# Patient Record
Sex: FEMALE | NOT HISPANIC OR LATINO | URBAN - METROPOLITAN AREA
[De-identification: names, ages, dates, MRNs, and addresses within clinical notes are randomized per-mention and may not be internally consistent; named-entity substitution may affect disease eponyms.]

---

## 2024-07-02 ENCOUNTER — HOSPITAL ENCOUNTER (EMERGENCY)
Facility: HOSPITAL | Age: 64
Discharge: HOME | End: 2024-07-02
Attending: EMERGENCY MEDICINE
Payer: COMMERCIAL

## 2024-07-02 VITALS
DIASTOLIC BLOOD PRESSURE: 89 MMHG | HEART RATE: 87 BPM | WEIGHT: 180 LBS | SYSTOLIC BLOOD PRESSURE: 165 MMHG | TEMPERATURE: 98 F | BODY MASS INDEX: 28.93 KG/M2 | HEIGHT: 66 IN | OXYGEN SATURATION: 97 % | RESPIRATION RATE: 16 BRPM

## 2024-07-02 DIAGNOSIS — G89.29 CHRONIC PAIN OF LEFT KNEE: Primary | ICD-10-CM

## 2024-07-02 DIAGNOSIS — M25.562 CHRONIC PAIN OF LEFT KNEE: Primary | ICD-10-CM

## 2024-07-02 DIAGNOSIS — M17.12 OSTEOARTHRITIS OF LEFT KNEE, UNSPECIFIED OSTEOARTHRITIS TYPE: ICD-10-CM

## 2024-07-02 PROCEDURE — 63600175 PHARM REV CODE 636 W HCPCS: Performed by: PHYSICIAN ASSISTANT

## 2024-07-02 PROCEDURE — 96372 THER/PROPH/DIAG INJ SC/IM: CPT | Performed by: PHYSICIAN ASSISTANT

## 2024-07-02 PROCEDURE — 25000003 PHARM REV CODE 250: Performed by: PHYSICIAN ASSISTANT

## 2024-07-02 PROCEDURE — 99284 EMERGENCY DEPT VISIT MOD MDM: CPT | Mod: 25

## 2024-07-02 RX ORDER — ACETAMINOPHEN 325 MG/1
650 TABLET ORAL
Status: COMPLETED | OUTPATIENT
Start: 2024-07-02 | End: 2024-07-02

## 2024-07-02 RX ORDER — IBUPROFEN 800 MG/1
800 TABLET ORAL EVERY 6 HOURS PRN
Qty: 20 TABLET | Refills: 0 | Status: SHIPPED | OUTPATIENT
Start: 2024-07-02

## 2024-07-02 RX ORDER — KETOROLAC TROMETHAMINE 30 MG/ML
10 INJECTION, SOLUTION INTRAMUSCULAR; INTRAVENOUS
Status: COMPLETED | OUTPATIENT
Start: 2024-07-02 | End: 2024-07-02

## 2024-07-02 RX ADMIN — ACETAMINOPHEN 650 MG: 325 TABLET ORAL at 01:07

## 2024-07-02 RX ADMIN — KETOROLAC TROMETHAMINE 10 MG: 30 INJECTION, SOLUTION INTRAMUSCULAR; INTRAVENOUS at 01:07

## 2024-07-02 NOTE — ED PROVIDER NOTES
"Encounter Date: 7/2/2024       History     Chief Complaint   Patient presents with    Knee Pain     L knee pain seen by ortho, wanting mri     12:52 PM  Patient is a 65 yo female with a history of diabetes mellitus type 2, hypertension, small goiter with history of hyperthyroidis who presents the [facility] ED via POV for emergent evaluation of left knee pain.    States that she has "bone-on-bone" of her left knee.  States that she was told that she needed replacement but opted all in tried other therapies.  Currently she is getting "gel therapy" which sounds like Viscosupplementation Treatment for her knee arthritis.  She had an injection a few weeks ago.  She presents to [facility] ED today for a 2nd opinion.  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  She has not tried physical therapy at.  She takes ibuprofen for pain relief.  Was prescribed tramadol but she did not like the way it made her feel.    She is supposed to see Endocrinology for her diabetes to see if she qualifies for steroid treatment with orthopedics.  She would like an MRI in the ED.        Review of patient's allergies indicates:  No Known Allergies  History reviewed. No pertinent past medical history.  History reviewed. No pertinent surgical history.  No family history on file.  Social History     Tobacco Use    Smoking status: Never    Smokeless tobacco: Never   Substance Use Topics    Alcohol use: Never    Drug use: Never     Review of Systems   Constitutional:  Negative for fever.   HENT:  Negative for sore throat.    Respiratory:  Negative for shortness of breath.    Cardiovascular:  Negative for chest pain.   Gastrointestinal:  Negative for nausea.   Genitourinary:  Negative for dysuria.   Musculoskeletal:  Positive for arthralgias. Negative for back pain.   Skin:  Negative for rash.   Neurological:  Negative for weakness.   Hematological:  Does not bruise/bleed easily.       Physical Exam "     Initial Vitals [07/02/24 1213]   BP Pulse Resp Temp SpO2   (!) 165/89 87 16 97.5 °F (36.4 °C) 97 %      MAP       --         Physical Exam    Vitals reviewed.  Constitutional: She appears well-developed and well-nourished. She is not diaphoretic. She is cooperative.  Non-toxic appearance. She does not have a sickly appearance. She does not appear ill. No distress.   HENT:   Head: Normocephalic and atraumatic.   Nose: Nose normal.   Mouth/Throat: No trismus in the jaw.   Eyes: Conjunctivae and EOM are normal.   Neck:   Normal range of motion.  Pulmonary/Chest: No accessory muscle usage. No tachypnea. No respiratory distress.   Abdominal: She exhibits no distension.   Musculoskeletal:         General: Normal range of motion.      Cervical back: Normal range of motion.      Right knee: Normal.      Left knee: No swelling, deformity, effusion, erythema, ecchymosis, lacerations, bony tenderness or crepitus. Normal range of motion. No tenderness. No ACL laxity or PCL laxity.Normal alignment and normal patellar mobility.      Comments: She had mild pain with Ritesh's test to internal rotation.  No unilateral leg swelling or calf tenderness.   She is ambulatory and can bear weight without assistance; states she prefers to walk with her leg straight because it eases the pain.     Neurological: She is alert. She has normal strength.   Skin: Skin is warm and dry. No erythema. No pallor.         ED Course   Procedures  Labs Reviewed - No data to display       Imaging Results    None          Medications   acetaminophen tablet 650 mg (650 mg Oral Given 7/2/24 1321)   ketorolac injection 9.999 mg (9.999 mg Intramuscular Given 7/2/24 1322)     Medical Decision Making  Patient is a 63 yo female with a history of diabetes mellitus type 2, hypertension, small goiter with history of hyperthyroidis who presents the [facility] ED via POV for emergent evaluation of left knee pain.    Differential diagnosis includes but isn't limited to  DDD, knee sprain, other ligamentous injury such as meniscus tear.  Doubt septic arthritis given exam.  No clinical signs or symptoms of DVT.  She does not have posterior knee pain that would suggest Baker's cyst.  No recent injury, trauma, or falls.  No bony tenderness.  She can bear weight.  Doubt fractures, dislocations.    Patient presents to [facility] ED for 2nd opinion of chronic left knee pain.  She is currently getting Knee Gel Injections (Viscosupplementation) for her knee pain with her outpatient orthopedist.  She is frustrated because she continues to have chronic pain despite recent therapy.  She had a little bit of pain with Tania's test on internal rotation which may suggest meniscus injury.  She also has which sounds like severe osteoarthritis of her left knee which they recommended surgery for but she opted out and wanted to try other therapies instead which is what she is currently doing.  I think the management of her knee pain is appropriate.  I explained to her that we do not get non emergent MRIs in the emergency department as there are no indications for any emergent MRI.  She understood this.  We will continue treat her conservatively.  She is diabetic and would not like steroids at this time.  She would like to talk to her endocrinologist which is fine.  I will give her Toradol intramuscular injection and acetaminophen.  We discussed continuing treatment with NSAIDs and acetaminophen.  She has no history of CKD.  I referred her to PT to see if this can help. We discussed weight loss and exercises that include workouts that does not put pressure on her knees. Follow-up with orthopedist here for another opinion as needed otherwise follow-up with hers.  All of her questions were answered.  Patient comfortable with plan and stable for discharge.    Risk  OTC drugs.  Prescription drug management.                                      Clinical Impression:  Final diagnoses:  [M25.562, G89.29] Chronic  pain of left knee (Primary)  [M17.12] Osteoarthritis of left knee, unspecified osteoarthritis type          ED Disposition Condition    Discharge           ED Prescriptions       Medication Sig Dispense Start Date End Date Auth. Provider    ibuprofen (ADVIL,MOTRIN) 800 MG tablet Take 1 tablet (800 mg total) by mouth every 6 (six) hours as needed. 20 tablet 7/2/2024 -- Sherin Lou PA-C          Follow-up Information       Follow up With Specialties Details Why Contact Info Additional Information    [facility] - [facility] Orth[facility]ics Schedule an appointment as soon as possible for a visit   [facility]  [facility] [id number] [phone] [facility] - Main Building, 5th Floor Please park in [facility] and take [address] elevator    [facility] - Rehab ([facility]) Physical Therapy Schedule an appointment as soon as possible for a visit   [facility] [id number] [phone]     [facility] - Emergency Dept Emergency Medicine  If symptoms worsen [facility] [id number] [phone]              Sherin Lou PA-C  07/02/24 1340

## 2024-07-02 NOTE — DISCHARGE INSTRUCTIONS
Look into Silver Sneakers with [employer]. You can go to the gym for free. You were given toradol intramuscular injection.  Take ibuprofen 600 mg every 6 hours as needed with food for anti-inflammatory relief.  You can take acetaminophen/tylenol 650 mg every 6 hours for added relief.  Apply ice to the area for 10-20 minutes every 4 hours. You can apply heat 2 days after for the same duration and frequency.  Start therapy. Referral placed.   Consider weight loss to ease pressure off bones.  Follow up with Orthopedics here for 2nd opinion.  Return to the ER for new or worsening symptoms.

## 2024-07-02 NOTE — ED TRIAGE NOTES
Birdie Dsouza, a 64 y.o. female presents to the ED w/ complaint of left knee pain, twisted a few weeks ago at work, saw ortho given injections but wants MRI to rule out injury    Triage note:  Chief Complaint   Patient presents with    Knee Pain     L knee pain seen by ortho, wanting mri     Review of patient's allergies indicates:  Not on File  No past medical history on file.      APPEARANCE: awake and alert in NAD. PAIN  7/10  SKIN: warm, dry and intact. No breakdown or bruising.  MUSCULOSKELETAL: Patient moving all extremities spontaneously, no obvious swelling or deformities noted. Ambulates independently.  RESPIRATORY: Denies shortness of breath.Respirations unlabored.   CARDIAC: Denies CP, 2+ distal pulses; no peripheral edema  ABDOMEN: S/ND/NT, Denies nausea  : voids spontaneously, denies difficulty  Neurologic: AAO x 4; follows commands equal strength in all extremities; denies numbness/tingling. Denies dizziness

## 2025-04-10 ENCOUNTER — TELEPHONE (OUTPATIENT)
Dept: PODIATRY | Facility: CLINIC | Age: 65
End: 2025-04-10
Payer: COMMERCIAL

## 2025-04-10 NOTE — TELEPHONE ENCOUNTER
Spoke with the pt and scheduled her for 4-25-25 at 8:45 with Dr Black           ----- Message from Iniki sent at 4/10/2025  1:14 PM CDT -----  .Type: Patient Call BackWho called:patientWhat is the request in detail:   #calling concerning needing to schedule a nail  care appt regarding fungus on toenails on both feet. patient stated she is diabetic and is concerned that the fungus could travelCan the clinic reply by [facility]?   Would the patient rather a call back or a response via [facility] call back number:  [phone]

## 2025-04-25 ENCOUNTER — OFFICE VISIT (OUTPATIENT)
Dept: PODIATRY | Facility: CLINIC | Age: 65
End: 2025-04-25
Payer: COMMERCIAL

## 2025-04-25 VITALS — WEIGHT: 179.88 LBS | BODY MASS INDEX: 28.91 KG/M2 | HEIGHT: 66 IN

## 2025-04-25 DIAGNOSIS — L60.9 DISEASE OF NAIL: ICD-10-CM

## 2025-04-25 DIAGNOSIS — B35.3 TINEA PEDIS OF BOTH FEET: Primary | ICD-10-CM

## 2025-04-25 PROCEDURE — 87101 SKIN FUNGI CULTURE: CPT | Performed by: PODIATRIST

## 2025-04-25 PROCEDURE — 4010F ACE/ARB THERAPY RXD/TAKEN: CPT | Mod: CPTII,S$GLB,, | Performed by: PODIATRIST

## 2025-04-25 PROCEDURE — 1126F AMNT PAIN NOTED NONE PRSNT: CPT | Mod: CPTII,S$GLB,, | Performed by: PODIATRIST

## 2025-04-25 PROCEDURE — 1101F PT FALLS ASSESS-DOCD LE1/YR: CPT | Mod: CPTII,S$GLB,, | Performed by: PODIATRIST

## 2025-04-25 PROCEDURE — 99999 PR PBB SHADOW E&M-EST. PATIENT-LVL III: CPT | Mod: PBBFAC,,, | Performed by: PODIATRIST

## 2025-04-25 PROCEDURE — 1159F MED LIST DOCD IN RCRD: CPT | Mod: CPTII,S$GLB,, | Performed by: PODIATRIST

## 2025-04-25 PROCEDURE — 3008F BODY MASS INDEX DOCD: CPT | Mod: CPTII,S$GLB,, | Performed by: PODIATRIST

## 2025-04-25 PROCEDURE — 99204 OFFICE O/P NEW MOD 45 MIN: CPT | Mod: S$GLB,,, | Performed by: PODIATRIST

## 2025-04-25 PROCEDURE — 3288F FALL RISK ASSESSMENT DOCD: CPT | Mod: CPTII,S$GLB,, | Performed by: PODIATRIST

## 2025-04-25 PROCEDURE — 1160F RVW MEDS BY RX/DR IN RCRD: CPT | Mod: CPTII,S$GLB,, | Performed by: PODIATRIST

## 2025-04-25 RX ORDER — CLOTRIMAZOLE AND BETAMETHASONE DIPROPIONATE 10; .64 MG/G; MG/G
CREAM TOPICAL 2 TIMES DAILY
Qty: 45 G | Refills: 1 | Status: SHIPPED | OUTPATIENT
Start: 2025-04-25

## 2025-04-25 RX ORDER — CHOLECALCIFEROL (VITAMIN D3) 50 MCG
TABLET ORAL
COMMUNITY

## 2025-04-25 RX ORDER — AMLODIPINE BESYLATE 5 MG/1
5 TABLET ORAL
COMMUNITY
Start: 2024-07-16 | End: 2025-07-16

## 2025-04-25 RX ORDER — LISINOPRIL AND HYDROCHLOROTHIAZIDE 20; 25 MG/1; MG/1
1 TABLET ORAL
COMMUNITY
Start: 2024-07-16 | End: 2025-07-16

## 2025-04-25 RX ORDER — AMMONIUM LACTATE 12 G/100G
1 CREAM TOPICAL 2 TIMES DAILY
Qty: 140 G | Refills: 0 | Status: SHIPPED | OUTPATIENT
Start: 2025-04-25

## 2025-04-25 RX ORDER — METFORMIN HYDROCHLORIDE 500 MG/1
1500 TABLET, EXTENDED RELEASE ORAL
COMMUNITY

## 2025-04-25 NOTE — PROGRESS NOTES
"Subjective:     Patient ID: Birdie Dsouza is a 64 y.o. female.    Chief Complaint: Fungus (Diabetic pt c/o BL toenail fungus, pt wears tennis shoes, PCP Ashutosh Zamora MD last seen 4/16/2025)    Birdie is a 64 y.o. female who presents to the clinic complaining of thick and discolored toenails on both feet. Birdie is inquiring about treatment options. Patient states she has been dealing with dry skin and fungal toenails for awhile. Patient states she has been using Ketoconazole in the past that helped some. Patient has no other pedal complains tat this time.     Problem List[1]    Medication List with Changes/Refills   New Medications    AMMONIUM LACTATE 12 % CREA    Apply 1 Act topically 2 (two) times daily.    CLOTRIMAZOLE-BETAMETHASONE 1-0.05% (LOTRISONE) CREAM    Apply topically 2 (two) times daily.   Current Medications    AMLODIPINE (NORVASC) 5 MG TABLET    Take 5 mg by mouth.    CHOLECALCIFEROL, VITAMIN D3, (VITAMIN D3) 50 MCG (2,000 UNIT) TAB    Take by mouth.    IBUPROFEN (ADVIL,MOTRIN) 800 MG TABLET    Take 1 tablet (800 mg total) by mouth every 6 (six) hours as needed.    LISINOPRIL-HYDROCHLOROTHIAZIDE (PRINZIDE,ZESTORETIC) 20-25 MG TAB    Take 1 tablet by mouth.    METFORMIN (GLUCOPHAGE-XR) 500 MG ER 24HR TABLET    Take 1,500 mg by mouth.    SODIUM HYALURONATE, EUFLEXXA, (EUFLEXXA) 10 MG/ML(MW 2.4 -3.6 MILLION) INJECTION    Inject 20 mg into the articular space.       Review of patient's allergies indicates:   Allergen Reactions    Codeine        History reviewed. No pertinent surgical history.    No family history on file.    Social History[2]    Vitals:    04/25/25 0829   Weight: 81.6 kg (179 lb 14.3 oz)   Height: 5' 6" (1.676 m)   PainSc: 0-No pain       Hemoglobin A1C   Date Value Ref Range Status   11/20/2024 7.5 (H) 4.2 - 5.8 % Final   07/25/2024 7.7 (H) 4.2 - 5.8 % Final   03/25/2024 8.6 (H) 4.2 - 5.8 % Final       Review of Systems   Constitutional:  Negative for chills and fever. "   Cardiovascular: Negative.    Gastrointestinal:  Negative for diarrhea, nausea and vomiting.   Skin:  Positive for itching and rash.   Neurological: Negative.    Endo/Heme/Allergies: Negative.    Psychiatric/Behavioral: Negative.           Objective:      PHYSICAL EXAM: Apperance: Alert and orient in no distress,well developed, and with good attention to grooming and body habits  Lower Extremity Exam  VASCULAR: Dorsalis pedis pulses 2/4 bilateral and Posterior Tibial pulses 2/4 bilateral.   DERMATOLOGICAL: No skin rash, subcutaneous nodules, lesions or ulcers observed. Dry and scaly skin noted plantarly bilateral in moccasin distribution. Webspaces 3,4 bilateral are fissured.  Nails 1,2,5 bilateral elongated, thickened, and discolored with subungual debris.   NEUROLOGICAL: Light touch, sharp-dull, proprioception all present and equal bilaterally.    MUSCULOSKELETAL: Muscle strength is 5/5 for foot inverters, everters, plantarflexors, and dorsiflexors. Muscle tone is normal.           Assessment:       ICD-10-CM ICD-9-CM   1. Tinea pedis of both feet  B35.3 110.4   2. Disease of nail  L60.9 703.9       Plan:   Tinea pedis of both feet  -     clotrimazole-betamethasone 1-0.05% (LOTRISONE) cream; Apply topically 2 (two) times daily.  Dispense: 45 g; Refill: 1  -     ammonium lactate 12 % Crea; Apply 1 Act topically 2 (two) times daily.  Dispense: 140 g; Refill: 0    Disease of nail  -     Fungal culture , skin, hair, or nails    I counseled the patient on her conditions, regarding findings of my examination, my impressions, and usual treatment plan.   Discuss treatment options for tinea pedis.  I explained that fungus lives in a warm dark moist environment and therefore patient should make every attempt to keep feet clean and dry.  We discussed drying feet thoroughly after shower particularly between the toes.   Patient instructed to spray all shoes with Lysol disinfectant spray and let dry before wearing. Patient  instructed to wash all socks in hot water and bleach.  We discussed using Lamisil for tinea pedis. This drug offers a fairly high but not universal cure rate. A 2-4 week treatment course is recommended. The patient is aware that rare cases of liver injury have been reported; and agrees to have a liver function tests performed. The symptoms of liver disease have been discussed; call if such occurs. Other side effects, such as headaches and rashes, have also been discussed.  Prescribed Lotrisone cream to be applied twice daily to areas of feet.   Prescription written for Ammonium Lactate 12% cream to be applied twice daily to area for moisturizer.  Discuss treatment options for nail fungus.  I explained that fungus lives in a warm dark moist environment and therefore patient should make every attempt to keep feet clean and dry.  We discussed drying feet thoroughly after shower particularly between the toes and then applying powder between the toes and in the shoes.    For fungal toenails I prescribed topical medication to be used daily for up to a year.  We also discussed oral Lamisil but I did not recommend it as a first line of treatment since it is an internal medicine that may potentially have side effects, including liver problems.   With patient's permission, nail clippings obtained for fungal nail culture.   Patient to return in 6 weeks or sooner if needed.          Malcom Black DPM  [facility]          [1] There is no problem list on file for this patient.   [2]   Social History  Socioeconomic History    Marital status:    Tobacco Use    Smoking status: Never    Smokeless tobacco: Never   Substance and Sexual Activity    Alcohol use: Never    Drug use: Never    Sexual activity: Not Currently     Social Drivers of Health     Financial Resource Strain: High Risk (7/5/2024)    Received from [facility]    Overall Financial Resource Strain (CARDIA)     Difficulty of Paying  Living Expenses: Hard   Food Insecurity: Unknown (7/5/2024)    Received from [facility]    Hunger Vital Sign     Worried About Running Out of Food in the Last Year: Never true   Transportation Needs: Unknown (7/5/2024)    Received from [facility]    PRAPARE - Transportation     Lack of Transportation (Medical): No   Physical Activity: Inactive (7/5/2024)    Received from [facility]    Exercise Vital Sign     Days of Exercise per Week: 0 days     Minutes of Exercise per Session: 0 min   Stress: Stress Concern Present (7/5/2024)    Received from [facility]    [employer] [place] [employer] - Occupational Stress Questionnaire     Feeling of Stress : Rather much   Housing Stability: Unknown (7/5/2024)    Received from [facility]    Housing Stability Vital Sign     Unable to Pay for Housing in the Last Year: No

## 2025-04-28 LAB — FUNGUS NAIL CULT: NORMAL

## 2025-05-01 ENCOUNTER — TELEPHONE (OUTPATIENT)
Dept: OBSTETRICS AND GYNECOLOGY | Facility: CLINIC | Age: 65
End: 2025-05-01
Payer: COMMERCIAL

## 2025-05-01 NOTE — TELEPHONE ENCOUNTER
----- Message from Sophia sent at 5/1/2025  9:28 AM CDT -----  Type:  Appointment RequestCaller is requesting a sooner appointment.  Caller declined first available appointment listed below.  Caller will not accept being placed on the waitlist and is requesting a message be sent to doctor.Name of Caller:pt When is the first available appointment?Symptoms:Annual Would the patient rather a call back or a response via [facility]? Call Best Call Back Number: [phone] Information: pt stated she had some spotting a few ago she would like to get check out as well

## 2025-05-02 ENCOUNTER — TELEPHONE (OUTPATIENT)
Dept: OBSTETRICS AND GYNECOLOGY | Facility: CLINIC | Age: 65
End: 2025-05-02
Payer: COMMERCIAL

## 2025-05-02 NOTE — TELEPHONE ENCOUNTER
Spoke with pt. Pt had spotting that started 2 days ago and lasted for a day. No pain with urination or odor. Pt called asking why she was scheduled with  since she was wanting to see . Explained to pt that  is not seeing NP annuals. Pt asked about NP Corby, informed her that she is over at [facility] so she would need to all over there. She asked about  as well, however  has appts in August. Advised pt that because she is having spotting, she should be seen as soon as possible. Pt VU and will keep appt with  on 5/07 at 9:15am

## 2025-05-14 ENCOUNTER — OFFICE VISIT (OUTPATIENT)
Dept: OBSTETRICS AND GYNECOLOGY | Facility: CLINIC | Age: 65
End: 2025-05-14
Payer: COMMERCIAL

## 2025-05-14 VITALS
BODY MASS INDEX: 29.75 KG/M2 | WEIGHT: 184.31 LBS | DIASTOLIC BLOOD PRESSURE: 92 MMHG | SYSTOLIC BLOOD PRESSURE: 172 MMHG

## 2025-05-14 DIAGNOSIS — Z78.0 POSTMENOPAUSAL: Primary | ICD-10-CM

## 2025-05-14 DIAGNOSIS — N95.0 POSTMENOPAUSAL BLEEDING: ICD-10-CM

## 2025-05-14 PROCEDURE — 99999 PR PBB SHADOW E&M-EST. PATIENT-LVL III: CPT | Mod: PBBFAC,,, | Performed by: OBSTETRICS & GYNECOLOGY

## 2025-05-14 PROCEDURE — 3080F DIAST BP >= 90 MM HG: CPT | Mod: CPTII,S$GLB,, | Performed by: OBSTETRICS & GYNECOLOGY

## 2025-05-14 PROCEDURE — 3288F FALL RISK ASSESSMENT DOCD: CPT | Mod: CPTII,S$GLB,, | Performed by: OBSTETRICS & GYNECOLOGY

## 2025-05-14 PROCEDURE — 3077F SYST BP >= 140 MM HG: CPT | Mod: CPTII,S$GLB,, | Performed by: OBSTETRICS & GYNECOLOGY

## 2025-05-14 PROCEDURE — 4010F ACE/ARB THERAPY RXD/TAKEN: CPT | Mod: CPTII,S$GLB,, | Performed by: OBSTETRICS & GYNECOLOGY

## 2025-05-14 PROCEDURE — 99203 OFFICE O/P NEW LOW 30 MIN: CPT | Mod: S$GLB,,, | Performed by: OBSTETRICS & GYNECOLOGY

## 2025-05-14 PROCEDURE — 1126F AMNT PAIN NOTED NONE PRSNT: CPT | Mod: CPTII,S$GLB,, | Performed by: OBSTETRICS & GYNECOLOGY

## 2025-05-14 PROCEDURE — 3008F BODY MASS INDEX DOCD: CPT | Mod: CPTII,S$GLB,, | Performed by: OBSTETRICS & GYNECOLOGY

## 2025-05-14 PROCEDURE — 1101F PT FALLS ASSESS-DOCD LE1/YR: CPT | Mod: CPTII,S$GLB,, | Performed by: OBSTETRICS & GYNECOLOGY

## 2025-05-14 PROCEDURE — 1159F MED LIST DOCD IN RCRD: CPT | Mod: CPTII,S$GLB,, | Performed by: OBSTETRICS & GYNECOLOGY

## 2025-05-14 NOTE — PROGRESS NOTES
OBSTETRIC HISTORY:   OB History          0    Para   0    Term   0       0    AB   0    Living   0         SAB   0    IAB   0    Ectopic   0    Multiple   0    Live Births   0                  COMPREHENSIVE GYN HISTORY:  PAP History: Denies abnormal Paps.  Infection History: Denies STDs. Denies PID.  Benign History: Denies uterine fibroids. Denies ovarian cysts. Denies endometriosis.   Cancer History: Denies cervical cancer. Denies uterine cancer or hyperplasia. Denies ovarian cancer. Denies vulvar cancer or pre-cancer. Denies vaginal cancer or pre-cancer. Denies breast cancer. Denies colon cancer.  Sexual Activity History:   reports that she is not currently sexually active.     HPI:   65 y.o.  No LMP recorded. Patient is postmenopausal.   Patient is  here complaining of  bleeding. Saw NP 25 that recommended EMBX and US. She did also do her annual. She denies bladder, breast and bowel complaints.    ROS:  GENERAL: No weight gain or weight loss.   CHEST: No shortness of breath.   ABDOMEN: No abdominal pain, constipation, diarrhea, nausea, vomiting or rectal bleeding.   URINARY: No frequency, dysuria, hematuria, or burning on urination.  REPRODUCTIVE: See HPI.   BREASTS: No breast pain, lumps, or nipple discharge.   PSYCHIATRIC: No depression or anxiety.        PE:   BP (!) 172/92   Wt 83.6 kg (184 lb 4.8 oz)   BMI 29.75 kg/m²   APPEARANCE: Well nourished, well developed, in no acute distress.  ABDOMEN: Soft. No tenderness or masses. No hernias.  PELVIC:   VULVA: No lesions. Normal female genitalia.  URETHRAL MEATUS: Normal size and location, no lesions, no prolapse.  URETHRA: No masses, tenderness, prolapse or scarring.  VAGINA: Moist and well rugated, no discharge, no significant cystocele or rectocele.  CERVIX: No lesions and discharge.Minimal blood tinge  UTERUS: Normal size, regular shape, mobile, non-tender, bladder base nontender.  ADNEXA: No masses or  tenderness.    ASSESSMENT/PLAN:   bleeding--discussed importance of work up--will order US  RTO based on results

## 2025-06-16 ENCOUNTER — TELEPHONE (OUTPATIENT)
Dept: OBSTETRICS AND GYNECOLOGY | Facility: CLINIC | Age: 65
End: 2025-06-16
Payer: COMMERCIAL

## 2025-06-16 NOTE — TELEPHONE ENCOUNTER
Spoke with pt. Pt saw  5/14. She was sent for an ultrasound. She hasn't gotten it done yet and is asking if she gets it done at [facility], will  still see it. Explained to her that if it is done outside of [facility], she will need to fax the results and disc to us or it will be in care everywhere. Pt CRISTOPHER.     She wants to see  depending on what the ultrasound reveals. I advised pt to get the ultrasound and see what  advises for next steps and if it is something she'd like to see  for then we can get her scheduled at that time.     Pt CRISTOPHER

## 2025-06-16 NOTE — TELEPHONE ENCOUNTER
Copied from CRM #[id number]. Topic: Appointments - Appointment Scheduling  >> Jun 16, 2025  3:46 PM Gillian wrote:  Type:  Appointment Request    Name of Caller:Birdie Dsouza    When is the first available appointment?No access    Symptoms:results of US     Would the patient rather a call back or a response via [facility]? Call back     Best Call Back Number: [phone]    Additional Information: pt states she was recently seen by Dr. Mckinley but wanted to see the provider first. Pt went ahead in scheduling as she wanted to be seen as soon as possible. pt states  that Dr. Mckinley has scheduled her an pelvic US and states she would like to know if the provider would look at the results of her US once completed. Pt states she wants to go outside of [facility] to have the US completed. Pt states she wants specifically Dr. Velasquez to see the results and would like to speak with someone to see if she does the order outside of [facility] if she will still be able to review the results. Pt would like a call back with further assistance as soon as possible.

## 2025-06-17 ENCOUNTER — TELEPHONE (OUTPATIENT)
Dept: OBSTETRICS AND GYNECOLOGY | Facility: CLINIC | Age: 65
End: 2025-06-17
Payer: COMMERCIAL

## 2025-06-17 NOTE — TELEPHONE ENCOUNTER
Copied from CRM #[id number]. Topic: Appointments - Appointment Scheduling  >> Jun 16, 2025  3:46 PM Gillian wrote:  Type:  Appointment Request    Name of Caller:Birdie Dsouza    When is the first available appointment?No access    Symptoms:results of US     Would the patient rather a call back or a response via [facility]? Call back     Best Call Back Number: [phone]    Additional Information: pt states she was recently seen by Dr. Mckinley but wanted to see the provider first. Pt went ahead in scheduling as she wanted to be seen as soon as possible. pt states  that Dr. Mckinley has scheduled her an pelvic US and states she would like to know if the provider would look at the results of her US once completed. Pt states she wants to go outside of [facility] to have the US completed. Pt states she wants specifically Dr. Velasquez to see the results and would like to speak with someone to see if she does the order outside of [facility] if she will still be able to review the results. Pt would like a call back with further assistance as soon as possible.

## 2025-06-18 ENCOUNTER — TELEPHONE (OUTPATIENT)
Dept: OBSTETRICS AND GYNECOLOGY | Facility: CLINIC | Age: 65
End: 2025-06-18
Payer: COMMERCIAL

## 2025-06-18 NOTE — TELEPHONE ENCOUNTER
Copied from CRM #[id number]. Topic: Appointments - Appointment Scheduling  >> Jun 16, 2025  3:46 PM Gillian wrote:  Type:  Appointment Request    Name of Caller:Birdie Dsouza    When is the first available appointment?No access    Symptoms:results of US     Would the patient rather a call back or a response via [facility]? Call back     Best Call Back Number: [phone]    Additional Information: pt states she was recently seen by Dr. Mckinley but wanted to see the provider first. Pt went ahead in scheduling as she wanted to be seen as soon as possible. pt states  that Dr. Mckinley has scheduled her an pelvic US and states she would like to know if the provider would look at the results of her US once completed. Pt states she wants to go outside of [facility] to have the US completed. Pt states she wants specifically Dr. Velasquez to see the results and would like to speak with someone to see if she does the order outside of [facility] if she will still be able to review the results. Pt would like a call back with further assistance as soon as possible.  
Showering allowed/Walking - Outdoors allowed/Do not drive or operate machinery/No heavy lifting/straining/Walking - Indoors allowed

## 2025-06-23 ENCOUNTER — HOSPITAL ENCOUNTER (OUTPATIENT)
Dept: RADIOLOGY | Facility: HOSPITAL | Age: 65
Discharge: HOME | End: 2025-06-23
Attending: OBSTETRICS & GYNECOLOGY
Payer: COMMERCIAL

## 2025-06-23 DIAGNOSIS — N95.0 POSTMENOPAUSAL BLEEDING: ICD-10-CM

## 2025-06-25 ENCOUNTER — TELEPHONE (OUTPATIENT)
Dept: OBSTETRICS AND GYNECOLOGY | Facility: CLINIC | Age: 65
End: 2025-06-25
Payer: COMMERCIAL

## 2025-06-25 NOTE — TELEPHONE ENCOUNTER
Copied from CRM #[id number]. Topic: General Inquiry - Patient Advice  >> Jun 24, 2025  8:23 AM Rosie wrote:  Type:  Patient Call    Who Called:Pt   Would the patient rather a call back or a response via [facility]? Call back   Best Call Back Number:[phone]  Additional Information: Pt wants to speak with Dr Velasquez about her results and want her advice about what would be best treatment

## 2025-06-26 ENCOUNTER — OFFICE VISIT (OUTPATIENT)
Dept: OBSTETRICS AND GYNECOLOGY | Facility: CLINIC | Age: 65
End: 2025-06-26
Payer: COMMERCIAL

## 2025-06-26 VITALS
DIASTOLIC BLOOD PRESSURE: 96 MMHG | SYSTOLIC BLOOD PRESSURE: 158 MMHG | BODY MASS INDEX: 28.98 KG/M2 | WEIGHT: 179.56 LBS

## 2025-06-26 DIAGNOSIS — C54.1 ENDOMETRIAL CANCER DETERMINED BY UTERINE BIOPSY: Primary | ICD-10-CM

## 2025-06-26 PROCEDURE — 3288F FALL RISK ASSESSMENT DOCD: CPT | Mod: CPTII,S$GLB,, | Performed by: OBSTETRICS & GYNECOLOGY

## 2025-06-26 PROCEDURE — 99999 PR PBB SHADOW E&M-EST. PATIENT-LVL III: CPT | Mod: PBBFAC,,, | Performed by: OBSTETRICS & GYNECOLOGY

## 2025-06-26 PROCEDURE — 3008F BODY MASS INDEX DOCD: CPT | Mod: CPTII,S$GLB,, | Performed by: OBSTETRICS & GYNECOLOGY

## 2025-06-26 PROCEDURE — 1159F MED LIST DOCD IN RCRD: CPT | Mod: CPTII,S$GLB,, | Performed by: OBSTETRICS & GYNECOLOGY

## 2025-06-26 PROCEDURE — 1101F PT FALLS ASSESS-DOCD LE1/YR: CPT | Mod: CPTII,S$GLB,, | Performed by: OBSTETRICS & GYNECOLOGY

## 2025-06-26 PROCEDURE — 3077F SYST BP >= 140 MM HG: CPT | Mod: CPTII,S$GLB,, | Performed by: OBSTETRICS & GYNECOLOGY

## 2025-06-26 PROCEDURE — 99213 OFFICE O/P EST LOW 20 MIN: CPT | Mod: S$GLB,,, | Performed by: OBSTETRICS & GYNECOLOGY

## 2025-06-26 PROCEDURE — 3080F DIAST BP >= 90 MM HG: CPT | Mod: CPTII,S$GLB,, | Performed by: OBSTETRICS & GYNECOLOGY

## 2025-06-26 PROCEDURE — 4010F ACE/ARB THERAPY RXD/TAKEN: CPT | Mod: CPTII,S$GLB,, | Performed by: OBSTETRICS & GYNECOLOGY

## 2025-06-26 NOTE — PROGRESS NOTES
OBSTETRIC HISTORY:   OB History          0    Para   0    Term   0       0    AB   0    Living   0         SAB   0    IAB   0    Ectopic   0    Multiple   0    Live Births   0                COMPREHENSIVE GYN HISTORY:  PAP History: Denies abnormal Paps.  Infection History: Denies STDs. Denies PID.  Benign History: Denies uterine fibroids. Denies ovarian cysts. Denies endometriosis.   Cancer History: UTERINE CANCER. Denies cervical cancer. Denies ovarian cancer. Denies vulvar cancer or pre-cancer. Denies vaginal cancer or pre-cancer. Denies breast cancer. Denies colon cancer.  Sexual Activity History:   reports that she is not currently sexually active.    HPI:   65 y.o.  No LMP recorded. Patient is postmenopausal.   Patient is seen by me 25 for  bleeding and discussed need for endometrial biopsy but she declined and wanted to do an ultrasound. She had also declined same service with NP in [place] 25. She finally had ultrasound which showed thickened lining and had biopsy by Long Wright NP 25 which showed high grade endometrial cancer. The services she needs are not covered by her insurance and they were trying to refer her to Dr. Erickson. We discussed her diagnosis and that I would place the consult. I also advised patient to start working on control of her diabetes.        PE:   BP (!) 158/96   Wt 81.4 kg (179 lb 9 oz)   BMI 28.98 kg/m²     ASSESSMENT/PLAN:  Endometrial cancer diagnosed by biopsy  RTO prn

## 2025-06-27 ENCOUNTER — TELEPHONE (OUTPATIENT)
Dept: GYNECOLOGIC ONCOLOGY | Facility: CLINIC | Age: 65
End: 2025-06-27
Payer: COMMERCIAL

## 2025-06-27 NOTE — TELEPHONE ENCOUNTER
Relayed information to pt. Pt is aware and had her appt with  yesterday as well. She is scheduled with  and does not need assistance scheduling at this time.

## 2025-06-27 NOTE — NURSING
New referral received from Dr Mckinley/Kyle NP  for recent diagnosis of cancer per EMB. Pt called and name and  verified. Explained my role as nurse navigator and direct number provided. Options for scheduling with Dr Erickson GYN/ONC at all clinic locations and soonest availability discussed with pt. Pt scheduled to see Dr Erickson in an available appointment on . PT aware sooner appointment on  available but was unable to come to [facility] on Monday. . Patient encouraged to call for any questions or concerns about her care or appointments. Pt verbalized understanding. Date time and location of appointment reviewed with pt. Appointment letter mailed to pt.     Oncology Navigation   Intake  Cancer Type: -- (HIGH GRADE SEROUS CARCINOMA)  Type of Referral: External (Dr Mckinley/ Kyle HACKETT)  Date of Referral: 25  Initial Nurse Navigator Contact: 25  Referral to Initial Contact Timeline (days): 3  First Appointment Available: 25  Appointment Date: 25  First Available Date vs. Scheduled Date (days): 3  Reason if booked > 7 days after scheduling: Transportation coordination; Specific provider / access; Patient request     Treatment  Current Status: Active        Procedures: Biopsy; Ultrasound  Biopsy Schedule Date: 25  Ultrasound Schedule Date: 25     Providers:  PCP- Dr. Ashutosh QUINTERO-  / Long Wright, NP  Endocrinology- Dr Zhao Carter  Cardiology- Dr Avery Worley    Medical History:  HTN  DM  Hyperthyroidism  Gra[place]  HLD  CKD stage 2  osteoarthritis    25- visit with Rodolfo Burk NP, vaginal bleeding [id number]. Discussed in detail need for EMB due to PMB. She also c/o pressure on bladder and declines EMB stating that she would like the bladder and urine checked to see if the blood is coming from the bladder. US and urine ordered.     25- pt scheduled visit with Dr Trista Mckinley (second opinion- [facility]) Patient is here  complaining of  bleeding. Saw NP 4/21/25 that recommended EMBX and US.  bleeding--discussed importance of work up--will order US. RTO based on results.     6/19/25- visit with Rodolfo Burk NP for EMB and Ultrasound  Pathology EMB: - HIGH GRADE SEROUS CARCINOMA. (Pathology in care everywhere lab tab)  Ultrasound (in office [facility], report in media embedded in referral) Uterus 6.87 x 4.53 x 3.73 cm EMS 18.79 mm. Uterus seen, thickened complex endometrium (18.79mm), trace vascularity. Walls appear irregular. Right ovary not seen. Left ovary seen.     6/26/25- visit with Dr Trista Mckinley ([facility]) and referral sent to Dr Erickson GYN/ONC for pathology confirmed endometrial cancer. Pt is out of network with [facility]      Acuity      Follow Up  No follow-ups on file.

## 2025-06-27 NOTE — TELEPHONE ENCOUNTER
In review of her chart, she unfortunately has endometrial cancer. It is best that she be seen by a gynecology oncologist for treatment.     I think she has been scheduled with Dr. Erickson but let me know if assistance is needed with scheduling

## 2025-07-01 ENCOUNTER — TELEPHONE (OUTPATIENT)
Dept: GYNECOLOGIC ONCOLOGY | Facility: CLINIC | Age: 65
End: 2025-07-01
Payer: COMMERCIAL

## 2025-07-01 DIAGNOSIS — C56.9: Primary | ICD-10-CM

## 2025-07-01 NOTE — TELEPHONE ENCOUNTER
Pathology slide request fax for Specimen ID#: [id number] collected on 6/19/2025. Confirmation of fax being sent received at 10:22am. Navigator will follow to ensure results received and placed in pt chart.

## 2025-07-01 NOTE — TELEPHONE ENCOUNTER
Verified 2 pt identifiers. Spoke w/ pt who was concerned that she would need another ultrasound because the NP where she had it done told her to empty her bladder prior. RN explained that was fine, and if Dr. Erickson feels that imaging needs to be repeated that he will order that at 7/3 appt, pt VU.

## 2025-07-03 ENCOUNTER — OFFICE VISIT (OUTPATIENT)
Dept: GYNECOLOGIC ONCOLOGY | Facility: CLINIC | Age: 65
End: 2025-07-03
Payer: COMMERCIAL

## 2025-07-03 VITALS
HEIGHT: 66 IN | HEART RATE: 78 BPM | WEIGHT: 180.31 LBS | BODY MASS INDEX: 28.98 KG/M2 | DIASTOLIC BLOOD PRESSURE: 80 MMHG | RESPIRATION RATE: 18 BRPM | SYSTOLIC BLOOD PRESSURE: 153 MMHG

## 2025-07-03 DIAGNOSIS — R45.89 EMOTIONAL DISTRESS: ICD-10-CM

## 2025-07-03 DIAGNOSIS — C56.9: Primary | ICD-10-CM

## 2025-07-03 DIAGNOSIS — C54.1 ENDOMETRIAL CANCER DETERMINED BY UTERINE BIOPSY: Primary | ICD-10-CM

## 2025-07-03 DIAGNOSIS — E11.9 TYPE 2 DIABETES MELLITUS WITHOUT COMPLICATION, WITHOUT LONG-TERM CURRENT USE OF INSULIN: ICD-10-CM

## 2025-07-03 PROCEDURE — 99999 PR PBB SHADOW E&M-EST. PATIENT-LVL IV: CPT | Mod: PBBFAC,,, | Performed by: STUDENT IN AN ORGANIZED HEALTH CARE EDUCATION/TRAINING PROGRAM

## 2025-07-03 NOTE — PROGRESS NOTES
Referring Provider:  Trista Mckinley MD  [address] [place] [id number]   Subjective:      Patient ID: Birdie Dsouza is a 65 y.o. female.    Chief Complaint: Endometrial Cancer    Problem List Items Addressed This Visit    None  Visit Diagnoses         Emotional distress    -  Primary    Relevant Orders    Ambulatory Referral/Consult to Oncology Social Work      Endometrial cancer determined by uterine biopsy          Type 2 diabetes mellitus without complication, without long-term current use of insulin        Relevant Orders    Hemoglobin A1C           HPI Here to discuss treatment options for newly diagnosed serous endometrial cancer. Denies symptoms outside of light vaginal spotting. History of diabetes on metformin. A1c 11/2024 was 7.7 but says she's gained some weight and glucose control has suffered.     Review of Systems   Constitutional:  Negative for chills, fatigue and fever.   Respiratory:  Negative for cough and shortness of breath.    Cardiovascular:  Negative for chest pain.   Gastrointestinal:  Negative for abdominal distention, abdominal pain, constipation and diarrhea.   Genitourinary:  Positive for vaginal bleeding. Negative for dysuria and pelvic pain.   Musculoskeletal:  Negative for back pain.   Psychiatric/Behavioral:  Negative for dysphoric mood. The patient is not nervous/anxious.      Past Medical History[1]  Past Surgical History[2]  No family history on file.   Social History[3]     Objective:      Vitals:    07/03/25 1321   BP: (!) 153/80   Pulse: 78   Resp: 18      Physical Exam  Constitutional:       General: She is not in acute distress.  HENT:      Head: Normocephalic.   Cardiovascular:      Rate and Rhythm: Normal rate.      Pulses: Normal pulses.   Pulmonary:      Effort: Pulmonary effort is normal. No respiratory distress.   Abdominal:      General: There is no distension.      Tenderness: There is no abdominal tenderness. There is no guarding or rebound.    Genitourinary:     Comments: External genitalia normal. Vagina normal. Cervix with no visible lesions. Uterus mobile.  A female staff member was present for the exam    Neurological:      Mental Status: She is alert and oriented to person, place, and time.   Psychiatric:         Behavior: Behavior normal.           Assessment:       Emotional distress  -     Ambulatory Referral/Consult to Oncology Social Work; Future; Expected date: 07/10/2025    Endometrial cancer determined by uterine biopsy  -     Ambulatory referral/consult to Gynecologic Oncology    Type 2 diabetes mellitus without complication, without long-term current use of insulin  -     Hemoglobin A1C; Future; Expected date: 07/03/2025         Plan:       Serous Endometrial cancer: I had an extensive conversation with the patient today giving a broad overview of endometrial cancer to include epidemiology, risk factors, clinical features, diagnosis, as well as staging and surgical treatment.  I have recommended robotic-assisted hysterectomy, bilateral salpingo-oophorectomy and sentinel lymph node mapping and biopsy.  Based on the data from surgery we will determine whether adjuvant therapy would be recommended.  We reviewed that adjuvant chemotherapy is recommended in the majority of serous endometrial cancer cases. I discussed extensively the risks, benefits, alternatives, and indications of the planned procedure to include the risk of damage to bowel, bladder, ureter, or any other abdominal or pelvic organ as well as the risks of conversion to a laparotomy. Additionally, she understands the surgical risks of infection, allergic reaction, bleeding possibly severe enough to require blood transfusion, blood clots, and cardiopulmonary complications.  She expresses understanding, was given an opportunity to ask questions. After all questions had been answered she strongly desires to proceed with planned procedure.  Plan for robotic hysterectomy, BSO, SLND  at [facility] on 7/23/25.   CT C/A/P and CA-125 prior to surgery     2. Diabetes: check hgb A1c. Emphasized importance of perioperative glycemic control to minimize risk of perioperative complications.    3. Emotional distress: on screening questionnaire. No specific concerns voiced. Referral placed to .    Visit today is associated with current or anticipated ongoing medical care related to this patient's single serious condition/complex condition: endometrial cancer    Identified risks include diabetes and hypertension.         Pancho Erickson MD            [1]   Past Medical History:  Diagnosis Date    Diabetes mellitus, type 2     Gallstones     Hypertension    [2] No past surgical history on file.  [3]   Social History  Socioeconomic History    Marital status:

## 2025-07-03 NOTE — PROGRESS NOTES
Surgery Teaching completed.   Discussed surgery consents/blood consents. VU.  A copy of consent given to patient.  Enhanced Recovery to Gynecological Surgery:  Pre-op/Post-op visits. Kelsey will reach out to her to schedule the appointments. Additional information will be provided. VU.  Pain Management. Tylenol/Ibuprofen and Narcotic. VU  Activity after surgery. Booklet in folder to review.VU.   Infection precautions reviewed, VU.  Bleeding precautions reviewed, VU.   NPO instructions provided, VU.

## 2025-07-03 NOTE — Clinical Note
Kelsey, could you please schedule her for robotic hyst BSO SLN for endometrial cancer at [facility] 7/23. 1.5 hrs no frozen.  Thanks, Pancho

## 2025-07-03 NOTE — Clinical Note
Trista, thanks for sending Birdie to see me. She seemed somewhat overwhelmed by her diagnosis, but I hope I was able to offer some reassurance and a clear outline for our plan. Planning surgery this month.  Happy [place] Day, Pancho

## 2025-07-09 ENCOUNTER — TELEPHONE (OUTPATIENT)
Dept: GYNECOLOGIC ONCOLOGY | Facility: CLINIC | Age: 65
End: 2025-07-09
Payer: COMMERCIAL

## 2025-07-09 ENCOUNTER — HOSPITAL ENCOUNTER (OUTPATIENT)
Dept: RADIOLOGY | Facility: HOSPITAL | Age: 65
Discharge: HOME | End: 2025-07-09
Attending: STUDENT IN AN ORGANIZED HEALTH CARE EDUCATION/TRAINING PROGRAM
Payer: COMMERCIAL

## 2025-07-09 DIAGNOSIS — C56.9: ICD-10-CM

## 2025-07-09 LAB
CREAT SERPL-MCNC: 0.9 MG/DL (ref 0.5–1.4)
SAMPLE: NORMAL

## 2025-07-09 PROCEDURE — 74178 CT ABD&PLV WO CNTR FLWD CNTR: CPT | Mod: 26,,, | Performed by: RADIOLOGY

## 2025-07-09 PROCEDURE — 71270 CT THORAX DX C-/C+: CPT | Mod: 26,,, | Performed by: RADIOLOGY

## 2025-07-09 PROCEDURE — 74178 CT ABD&PLV WO CNTR FLWD CNTR: CPT | Mod: TC

## 2025-07-09 PROCEDURE — 25500020 PHARM REV CODE 255: Performed by: STUDENT IN AN ORGANIZED HEALTH CARE EDUCATION/TRAINING PROGRAM

## 2025-07-09 PROCEDURE — A9698 NON-RAD CONTRAST MATERIALNOC: HCPCS | Performed by: STUDENT IN AN ORGANIZED HEALTH CARE EDUCATION/TRAINING PROGRAM

## 2025-07-09 RX ADMIN — IOHEXOL 75 ML: 350 INJECTION, SOLUTION INTRAVENOUS at 12:07

## 2025-07-09 RX ADMIN — IOHEXOL 1000 ML: 9 SOLUTION ORAL at 11:07

## 2025-07-10 ENCOUNTER — TELEPHONE (OUTPATIENT)
Dept: GYNECOLOGIC ONCOLOGY | Facility: CLINIC | Age: 65
End: 2025-07-10
Payer: COMMERCIAL

## 2025-07-11 ENCOUNTER — TELEPHONE (OUTPATIENT)
Dept: GYNECOLOGIC ONCOLOGY | Facility: CLINIC | Age: 65
End: 2025-07-11
Payer: COMMERCIAL

## 2025-07-11 DIAGNOSIS — C56.9 MALIGNANT NEOPLASM OF UNSPECIFIED OVARY: ICD-10-CM

## 2025-07-11 DIAGNOSIS — C56.9: ICD-10-CM

## 2025-07-11 DIAGNOSIS — K76.9 LIVER LESION: ICD-10-CM

## 2025-07-11 DIAGNOSIS — C54.1 ENDOMETRIAL CANCER DETERMINED BY UTERINE BIOPSY: Primary | ICD-10-CM

## 2025-07-11 DIAGNOSIS — N93.9 VAGINAL BLEEDING: ICD-10-CM

## 2025-07-11 NOTE — TELEPHONE ENCOUNTER
----- Message from Kelsey NILO Barney sent at 7/11/2025  1:57 PM CDT -----  Good afternoon     Just spoke to patient, she still has some medical questions and concerns before agreeing to surgery scheduling or/and a MRI scan    Please call patient at [phone]    Thank you  ----- Message -----  From: Pancho Erickson MD  Sent: 7/11/2025  10:31 AM CDT  To: Diamond Richards RN; Beth Arevalo RN; Z#    Kelsey I just spoke to this patient.    She is ready to schedule preop, surgery, and post op.    I've also ordered an MRI abdomen to evaluate some small indeterminate liver lesions. If we could do that before surgery that would be great. If nothing is available, ok to do sometime post op.    She has also requested that records be sent to her PCP Dr. Ashutosh Zamora. She's scheduled to see him Wednesday.    Please send:   My progress note  Path report (in media)  CT scan report  CA-125  Hgb A1c    His fax number is [phone]    Federica,  Pancho

## 2025-07-11 NOTE — TELEPHONE ENCOUNTER
Pt identifers verified. All questions/concerns addressed regarding mri. Pt unsure about scheduling additional tests due to possible out of pocket expenses. Provided pt with billing and financial services number for possible financial assistance. Requested records faxed to PCP Dr. Zamora

## 2025-07-11 NOTE — TELEPHONE ENCOUNTER
----- Message from Pancho Erickson MD sent at 7/11/2025  2:15 PM CDT -----  Called her back again. She is now ready to schedule. Connie, she has financial questions about the MRI. Could you please help connect her to any resources she may need and whoever can help answer her questions?    Thanks everyone,   Pancho  ----- Message -----  From: Kelsey Barney  Sent: 7/11/2025   1:59 PM CDT  To: Diamond Richards, CHRISTOPHER; Beth Arevalo, RN; N#    Juan afternoon     Just spoke to patient, she still has some medical questions and concerns before agreeing to surgery scheduling or/and a MRI scan    Please call patient at [phone]    Thank you  ----- Message -----  From: Pancho Erickson MD  Sent: 7/11/2025  10:31 AM CDT  To: Diamond Richards RN; Beth Arevalo, RN; Z#    Kelsey I just spoke to this patient.    She is ready to schedule preop, surgery, and post op.    I've also ordered an MRI abdomen to evaluate some small indeterminate liver lesions. If we could do that before surgery that would be great. If nothing is available, ok to do sometime post op.    She has also requested that records be sent to her PCP Dr. Ashutosh Zamora. She's scheduled to see him Wednesday.    Please send:   My progress note  Path report (in media)  CT scan report  CA-125  Hgb A1c    His fax number is [phone]    Thanks,  Pancho

## 2025-07-18 ENCOUNTER — TELEPHONE (OUTPATIENT)
Dept: GYNECOLOGIC ONCOLOGY | Facility: CLINIC | Age: 65
End: 2025-07-18
Payer: COMMERCIAL

## 2025-07-18 NOTE — TELEPHONE ENCOUNTER
"Verified 2 patient identifies. Spoke w/ pt who said that she was calling to get a second opinion from Dr. Venegas, RN explained that we do not do second opinions within our office, but the patient is welcome to seek a second opinion regarding her diagnosis outside of [facility]. Pt stated "well I just don't understand why I have to do surgery and can't do chemo first", RN offered pt a return onc appt with Dr. Erickson, pt denied. Pt stated "well what is Dr. Erickson's survival rates, why can't anyone tell me those?". RN explained that those are not public knowledge that our office has access to. Pt then stated that "well what experience does he have? Is he even good? Why do I need surgery? Why can't I just have chemo?" RN explained that Dr. Erickson as well as all of our surgeons in our office are great and very experienced in surgery, however if the pt is having doubts and has these type of questions then she needs to come in for a follow up visit to discuss, pt denied offered appt on 7/21. Pt then began repeating the same questions again, RN stated that she would forward message to Dr. Erickson but he will likely want the pt to come in for a f/u, pt VU.   "

## 2025-07-20 ENCOUNTER — HOSPITAL ENCOUNTER (OUTPATIENT)
Dept: RADIOLOGY | Facility: HOSPITAL | Age: 65
Discharge: HOME | End: 2025-07-20
Attending: STUDENT IN AN ORGANIZED HEALTH CARE EDUCATION/TRAINING PROGRAM
Payer: COMMERCIAL

## 2025-07-20 DIAGNOSIS — K76.9 LIVER LESION: ICD-10-CM

## 2025-07-20 DIAGNOSIS — C56.9: ICD-10-CM

## 2025-07-20 DIAGNOSIS — C54.1 ENDOMETRIAL CANCER DETERMINED BY UTERINE BIOPSY: ICD-10-CM

## 2025-07-20 PROCEDURE — 74181 MRI ABDOMEN W/O CONTRAST: CPT | Mod: 26,,, | Performed by: RADIOLOGY

## 2025-07-20 PROCEDURE — 72195 MRI PELVIS W/O DYE: CPT | Mod: TC

## 2025-07-20 PROCEDURE — 72195 MRI PELVIS W/O DYE: CPT | Mod: 26,,, | Performed by: RADIOLOGY

## 2025-07-23 ENCOUNTER — DOCUMENTATION ONLY (OUTPATIENT)
Dept: GYNECOLOGIC ONCOLOGY | Facility: CLINIC | Age: 65
End: 2025-07-23
Payer: COMMERCIAL

## 2025-07-23 NOTE — PROGRESS NOTES
Oncology Social Worker consulted - received In Basket Epic message, Silvina referral. Called patient at her cell. Number,  [phone], and she answered but said that she was at a doctor's appointment. Verified that she can see my direct number on her phone screen and explained that she can call me back at this number later when she is available. She stated understanding and agreement. Will continue to follow.

## 2025-07-24 ENCOUNTER — TELEPHONE (OUTPATIENT)
Dept: GYNECOLOGIC ONCOLOGY | Facility: CLINIC | Age: 65
End: 2025-07-24
Payer: COMMERCIAL

## 2025-07-24 NOTE — TELEPHONE ENCOUNTER
Copied from CRM #[id number]. Topic: General Inquiry - Patient Advice  >> Jul 24, 2025  8:45 AM Roxanne wrote:  Type:  Needs Medical Advice    Who Called: pt  Would the patient rather a call back or a response via [facility]? call  Best Call Back Number: [phone]  Additional Information: pt would like a copy of her labs from office asking for a call

## 2025-07-25 ENCOUNTER — ANESTHESIA EVENT (OUTPATIENT)
Dept: SURGERY | Facility: OTHER | Age: 65
End: 2025-07-25
Payer: COMMERCIAL

## 2025-07-25 ENCOUNTER — TELEPHONE (OUTPATIENT)
Dept: GYNECOLOGIC ONCOLOGY | Facility: CLINIC | Age: 65
End: 2025-07-25
Payer: COMMERCIAL

## 2025-07-28 ENCOUNTER — OFFICE VISIT (OUTPATIENT)
Dept: GYNECOLOGIC ONCOLOGY | Facility: CLINIC | Age: 65
End: 2025-07-28
Attending: STUDENT IN AN ORGANIZED HEALTH CARE EDUCATION/TRAINING PROGRAM
Payer: COMMERCIAL

## 2025-07-28 VITALS
WEIGHT: 179.13 LBS | HEART RATE: 70 BPM | SYSTOLIC BLOOD PRESSURE: 140 MMHG | BODY MASS INDEX: 28.79 KG/M2 | TEMPERATURE: 98 F | HEIGHT: 66 IN | DIASTOLIC BLOOD PRESSURE: 75 MMHG

## 2025-07-28 DIAGNOSIS — C54.1 ENDOMETRIAL CANCER DETERMINED BY UTERINE BIOPSY: Primary | ICD-10-CM

## 2025-07-28 DIAGNOSIS — C54.1 ENDOMETRIAL CANCER: ICD-10-CM

## 2025-07-28 PROCEDURE — 1159F MED LIST DOCD IN RCRD: CPT | Mod: CPTII,S$GLB,, | Performed by: STUDENT IN AN ORGANIZED HEALTH CARE EDUCATION/TRAINING PROGRAM

## 2025-07-28 PROCEDURE — 3078F DIAST BP <80 MM HG: CPT | Mod: CPTII,S$GLB,, | Performed by: STUDENT IN AN ORGANIZED HEALTH CARE EDUCATION/TRAINING PROGRAM

## 2025-07-28 PROCEDURE — 1126F AMNT PAIN NOTED NONE PRSNT: CPT | Mod: CPTII,S$GLB,, | Performed by: STUDENT IN AN ORGANIZED HEALTH CARE EDUCATION/TRAINING PROGRAM

## 2025-07-28 PROCEDURE — 3052F HG A1C>EQUAL 8.0%<EQUAL 9.0%: CPT | Mod: CPTII,S$GLB,, | Performed by: STUDENT IN AN ORGANIZED HEALTH CARE EDUCATION/TRAINING PROGRAM

## 2025-07-28 PROCEDURE — 3077F SYST BP >= 140 MM HG: CPT | Mod: CPTII,S$GLB,, | Performed by: STUDENT IN AN ORGANIZED HEALTH CARE EDUCATION/TRAINING PROGRAM

## 2025-07-28 PROCEDURE — 3008F BODY MASS INDEX DOCD: CPT | Mod: CPTII,S$GLB,, | Performed by: STUDENT IN AN ORGANIZED HEALTH CARE EDUCATION/TRAINING PROGRAM

## 2025-07-28 PROCEDURE — G2211 COMPLEX E/M VISIT ADD ON: HCPCS | Mod: S$GLB,,, | Performed by: STUDENT IN AN ORGANIZED HEALTH CARE EDUCATION/TRAINING PROGRAM

## 2025-07-28 PROCEDURE — 99999 PR PBB SHADOW E&M-EST. PATIENT-LVL III: CPT | Mod: PBBFAC,,, | Performed by: STUDENT IN AN ORGANIZED HEALTH CARE EDUCATION/TRAINING PROGRAM

## 2025-07-28 PROCEDURE — 1101F PT FALLS ASSESS-DOCD LE1/YR: CPT | Mod: CPTII,S$GLB,, | Performed by: STUDENT IN AN ORGANIZED HEALTH CARE EDUCATION/TRAINING PROGRAM

## 2025-07-28 PROCEDURE — 3288F FALL RISK ASSESSMENT DOCD: CPT | Mod: CPTII,S$GLB,, | Performed by: STUDENT IN AN ORGANIZED HEALTH CARE EDUCATION/TRAINING PROGRAM

## 2025-07-28 PROCEDURE — 99215 OFFICE O/P EST HI 40 MIN: CPT | Mod: S$GLB,,, | Performed by: STUDENT IN AN ORGANIZED HEALTH CARE EDUCATION/TRAINING PROGRAM

## 2025-07-28 PROCEDURE — 4010F ACE/ARB THERAPY RXD/TAKEN: CPT | Mod: CPTII,S$GLB,, | Performed by: STUDENT IN AN ORGANIZED HEALTH CARE EDUCATION/TRAINING PROGRAM

## 2025-07-28 PROCEDURE — 99417 PROLNG OP E/M EACH 15 MIN: CPT | Mod: S$GLB,,, | Performed by: STUDENT IN AN ORGANIZED HEALTH CARE EDUCATION/TRAINING PROGRAM

## 2025-07-28 NOTE — PROGRESS NOTES
"Patient with questions regarding STD and FMLA. Wrote down steps to get forms from her employer and send them to the MD in [place]. They will fill it out or their disability team will do the work and get it signed and fax it to her employer. She states "this is so confused and frustrating". Kelsey discussed with her when she get's her insurance and her FMLA straighten out. We encourage her to call us when she is ready for surgery and Kelsey will giver the options for days to pick. Patient VU.    "

## 2025-07-28 NOTE — PROGRESS NOTES
Referring Provider:  Trista Mckinley MD  [address] [place] [id number]   Subjective:      Patient ID: Birdie Dsouza is a 65 y.o. female.    Chief Complaint: Follow-up (Discuss surgery )    Problem List Items Addressed This Visit       Endometrial cancer     Other Visit Diagnoses         Endometrial cancer determined by uterine biopsy    -  Primary             Follow-up  Pertinent negatives include no abdominal pain, chest pain, chills, coughing, fatigue or fever.   Following initial visit, and several phone conversations with myself and our staff, Birdie has many remaining questions, specifically about the risks of surgery.     Review of Systems   Constitutional:  Negative for chills, fatigue and fever.   Respiratory:  Negative for cough and shortness of breath.    Cardiovascular:  Negative for chest pain.   Gastrointestinal:  Negative for abdominal distention, abdominal pain, constipation and diarrhea.   Genitourinary:  Positive for vaginal bleeding. Negative for dysuria and pelvic pain.   Musculoskeletal:  Negative for back pain.   Psychiatric/Behavioral:  Negative for dysphoric mood. The patient is not nervous/anxious.      Past Medical History[1]  Past Surgical History[2]  No family history on file.   Social History[3]     Objective:      Vitals:    07/28/25 1102   BP: (!) 140/75   Pulse: 70   Temp: 97.9 °F (36.6 °C)      Physical Exam  Constitutional:       General: She is not in acute distress.  HENT:      Head: Normocephalic.   Cardiovascular:      Rate and Rhythm: Normal rate.      Pulses: Normal pulses.   Pulmonary:      Effort: Pulmonary effort is normal. No respiratory distress.   Abdominal:      General: There is no distension.      Tenderness: There is no abdominal tenderness. There is no guarding or rebound.   Genitourinary:     Comments:     Neurological:      Mental Status: She is alert and oriented to person, place, and time.   Psychiatric:         Behavior: Behavior normal.            Assessment:       Endometrial cancer determined by uterine biopsy    Endometrial cancer           Plan:       Serous Endometrial cancer: CT and MRI without obvious metastatic disease. CA-125 mildly elevated. Recommend proceeding with surgery. Reviewed benefits for staging, prognosis, and adjuvant treatment planning. Answered many questions that Birdie has today. She voices concerns on her personal level of surgical risk. We discussed that generally patients recover very well from minimally invasive surgery for endometrial cancer, but that all surgery introduces risks that cannot be completely eliminated. She states she knows that she needs surgery, but wants to cancel surgery this week because she relays that she has a lot going on. Encouraged her to keep her scheduled surgery date. Relayed that a delay in surgery could increase the risk of developing metastatic disease and could affect her prognosis. Recommend initial management with surgery, but reviewed that radiation or chemotherapy are alternatives. Reviewed that these options could be less effective than surgery followed by tailored adjuvant therapy based on pathology findings and staging information.   Instructed patient to call when she is ready to schedule surgery  [facility] surgical risk estimate: rate of serious complication 4.5% (average), any complication 5.4% (below average). Return to OR 1%, readmission 1.8%    I spent 70 minutes on today's visit.        Pancho Erickson MD                [1]   Past Medical History:  Diagnosis Date    Diabetes mellitus, type 2     Gallstones     Hypertension    [2] No past surgical history on file.  [3]   Social History  Socioeconomic History    Marital status:

## 2025-07-28 NOTE — Clinical Note
Possible surgery dates that I'm available: 8/11 [facility] 8/20 [facility] 8/20 [facility] 8/21 [facility] 8/27 [facility]  Could you please offer these dates and see when she would like to reschedule?   Diamond, if she cancels again, she may need to go somewhere to have a second opinion so someone else can explain things.

## 2025-08-12 ENCOUNTER — TELEPHONE (OUTPATIENT)
Dept: GYNECOLOGIC ONCOLOGY | Facility: CLINIC | Age: 65
End: 2025-08-12
Payer: COMMERCIAL

## 2025-08-19 ENCOUNTER — TELEPHONE (OUTPATIENT)
Dept: GYNECOLOGIC ONCOLOGY | Facility: CLINIC | Age: 65
End: 2025-08-19
Payer: COMMERCIAL

## 2025-08-20 ENCOUNTER — TELEPHONE (OUTPATIENT)
Dept: GYNECOLOGIC ONCOLOGY | Facility: CLINIC | Age: 65
End: 2025-08-20
Payer: COMMERCIAL

## 2025-08-21 ENCOUNTER — TELEPHONE (OUTPATIENT)
Dept: GYNECOLOGIC ONCOLOGY | Facility: CLINIC | Age: 65
End: 2025-08-21
Payer: COMMERCIAL

## 2025-08-21 ENCOUNTER — OFFICE VISIT (OUTPATIENT)
Dept: GYNECOLOGIC ONCOLOGY | Facility: CLINIC | Age: 65
End: 2025-08-21
Payer: COMMERCIAL

## 2025-08-21 ENCOUNTER — HOSPITAL ENCOUNTER (OUTPATIENT)
Dept: PREADMISSION TESTING | Facility: OTHER | Age: 65
Discharge: HOME | End: 2025-08-21
Attending: STUDENT IN AN ORGANIZED HEALTH CARE EDUCATION/TRAINING PROGRAM
Payer: COMMERCIAL

## 2025-08-21 VITALS
TEMPERATURE: 98 F | OXYGEN SATURATION: 99 % | DIASTOLIC BLOOD PRESSURE: 72 MMHG | BODY MASS INDEX: 28.89 KG/M2 | SYSTOLIC BLOOD PRESSURE: 144 MMHG | HEART RATE: 81 BPM | WEIGHT: 179 LBS

## 2025-08-21 VITALS
SYSTOLIC BLOOD PRESSURE: 139 MMHG | HEART RATE: 100 BPM | WEIGHT: 178.81 LBS | BODY MASS INDEX: 28.86 KG/M2 | DIASTOLIC BLOOD PRESSURE: 80 MMHG

## 2025-08-21 DIAGNOSIS — C54.1 ENDOMETRIAL CANCER: Primary | ICD-10-CM

## 2025-08-21 DIAGNOSIS — Z01.818 PREOP TESTING: Primary | ICD-10-CM

## 2025-08-21 LAB
INDIRECT COOMBS: NORMAL
RH BLD: NORMAL
SPECIMEN OUTDATE: NORMAL

## 2025-08-21 PROCEDURE — 1126F AMNT PAIN NOTED NONE PRSNT: CPT | Mod: CPTII,S$GLB,, | Performed by: STUDENT IN AN ORGANIZED HEALTH CARE EDUCATION/TRAINING PROGRAM

## 2025-08-21 PROCEDURE — 1101F PT FALLS ASSESS-DOCD LE1/YR: CPT | Mod: CPTII,S$GLB,, | Performed by: STUDENT IN AN ORGANIZED HEALTH CARE EDUCATION/TRAINING PROGRAM

## 2025-08-21 PROCEDURE — 3288F FALL RISK ASSESSMENT DOCD: CPT | Mod: CPTII,S$GLB,, | Performed by: STUDENT IN AN ORGANIZED HEALTH CARE EDUCATION/TRAINING PROGRAM

## 2025-08-21 PROCEDURE — 3079F DIAST BP 80-89 MM HG: CPT | Mod: CPTII,S$GLB,, | Performed by: STUDENT IN AN ORGANIZED HEALTH CARE EDUCATION/TRAINING PROGRAM

## 2025-08-21 PROCEDURE — 99999 PR PBB SHADOW E&M-EST. PATIENT-LVL III: CPT | Mod: PBBFAC,,, | Performed by: STUDENT IN AN ORGANIZED HEALTH CARE EDUCATION/TRAINING PROGRAM

## 2025-08-21 PROCEDURE — 4010F ACE/ARB THERAPY RXD/TAKEN: CPT | Mod: CPTII,S$GLB,, | Performed by: STUDENT IN AN ORGANIZED HEALTH CARE EDUCATION/TRAINING PROGRAM

## 2025-08-21 PROCEDURE — 99215 OFFICE O/P EST HI 40 MIN: CPT | Mod: S$GLB,,, | Performed by: STUDENT IN AN ORGANIZED HEALTH CARE EDUCATION/TRAINING PROGRAM

## 2025-08-21 PROCEDURE — 3052F HG A1C>EQUAL 8.0%<EQUAL 9.0%: CPT | Mod: CPTII,S$GLB,, | Performed by: STUDENT IN AN ORGANIZED HEALTH CARE EDUCATION/TRAINING PROGRAM

## 2025-08-21 PROCEDURE — 1159F MED LIST DOCD IN RCRD: CPT | Mod: CPTII,S$GLB,, | Performed by: STUDENT IN AN ORGANIZED HEALTH CARE EDUCATION/TRAINING PROGRAM

## 2025-08-21 PROCEDURE — 3075F SYST BP GE 130 - 139MM HG: CPT | Mod: CPTII,S$GLB,, | Performed by: STUDENT IN AN ORGANIZED HEALTH CARE EDUCATION/TRAINING PROGRAM

## 2025-08-21 PROCEDURE — 86900 BLOOD TYPING SEROLOGIC ABO: CPT

## 2025-08-21 PROCEDURE — 3008F BODY MASS INDEX DOCD: CPT | Mod: CPTII,S$GLB,, | Performed by: STUDENT IN AN ORGANIZED HEALTH CARE EDUCATION/TRAINING PROGRAM

## 2025-08-21 PROCEDURE — G2211 COMPLEX E/M VISIT ADD ON: HCPCS | Mod: S$GLB,,, | Performed by: STUDENT IN AN ORGANIZED HEALTH CARE EDUCATION/TRAINING PROGRAM

## 2025-08-21 RX ORDER — FAMOTIDINE 20 MG/1
20 TABLET, FILM COATED ORAL
OUTPATIENT
Start: 2025-08-21 | End: 2025-08-21

## 2025-08-21 RX ORDER — LIDOCAINE HYDROCHLORIDE 10 MG/ML
0.5 INJECTION, SOLUTION EPIDURAL; INFILTRATION; INTRACAUDAL; PERINEURAL ONCE
OUTPATIENT
Start: 2025-08-21 | End: 2025-08-21

## 2025-08-21 RX ORDER — SODIUM CHLORIDE, SODIUM LACTATE, POTASSIUM CHLORIDE, CALCIUM CHLORIDE 600; 310; 30; 20 MG/100ML; MG/100ML; MG/100ML; MG/100ML
INJECTION, SOLUTION INTRAVENOUS CONTINUOUS
OUTPATIENT
Start: 2025-08-21

## 2025-08-21 RX ORDER — ACETAMINOPHEN 500 MG
500 TABLET ORAL EVERY 6 HOURS PRN
COMMUNITY

## 2025-08-21 RX ORDER — ACETAMINOPHEN 500 MG
1000 TABLET ORAL
OUTPATIENT
Start: 2025-08-21 | End: 2025-08-21

## 2025-08-21 RX ORDER — INSULIN GLARGINE-YFGN 100 [IU]/ML
INJECTION, SOLUTION SUBCUTANEOUS
COMMUNITY

## 2025-08-22 ENCOUNTER — TELEPHONE (OUTPATIENT)
Dept: GYNECOLOGIC ONCOLOGY | Facility: CLINIC | Age: 65
End: 2025-08-22
Payer: COMMERCIAL

## 2025-08-26 ENCOUNTER — TELEPHONE (OUTPATIENT)
Dept: GYNECOLOGIC ONCOLOGY | Facility: CLINIC | Age: 65
End: 2025-08-26
Payer: COMMERCIAL

## 2025-08-27 ENCOUNTER — ANESTHESIA (OUTPATIENT)
Dept: SURGERY | Facility: OTHER | Age: 65
End: 2025-08-27
Payer: COMMERCIAL

## 2025-08-27 PROBLEM — Z98.890 S/P ROBOT-ASSISTED SURGICAL PROCEDURE: Status: ACTIVE | Noted: 2025-08-27

## 2025-08-27 PROCEDURE — 25000003 PHARM REV CODE 250: Performed by: NURSE ANESTHETIST, CERTIFIED REGISTERED

## 2025-08-27 PROCEDURE — 63600175 PHARM REV CODE 636 W HCPCS: Performed by: STUDENT IN AN ORGANIZED HEALTH CARE EDUCATION/TRAINING PROGRAM

## 2025-08-27 PROCEDURE — 63600175 PHARM REV CODE 636 W HCPCS: Performed by: NURSE ANESTHETIST, CERTIFIED REGISTERED

## 2025-08-27 PROCEDURE — 63600175 PHARM REV CODE 636 W HCPCS: Performed by: ANESTHESIOLOGY

## 2025-08-27 PROCEDURE — 63600175 PHARM REV CODE 636 W HCPCS

## 2025-08-27 PROCEDURE — 25000003 PHARM REV CODE 250: Performed by: STUDENT IN AN ORGANIZED HEALTH CARE EDUCATION/TRAINING PROGRAM

## 2025-08-27 RX ORDER — HYDROMORPHONE HYDROCHLORIDE 2 MG/ML
INJECTION, SOLUTION INTRAMUSCULAR; INTRAVENOUS; SUBCUTANEOUS
Status: DISCONTINUED | OUTPATIENT
Start: 2025-08-27 | End: 2025-08-27

## 2025-08-27 RX ORDER — ONDANSETRON HYDROCHLORIDE 2 MG/ML
INJECTION, SOLUTION INTRAVENOUS
Status: DISCONTINUED | OUTPATIENT
Start: 2025-08-27 | End: 2025-08-27

## 2025-08-27 RX ORDER — CEFAZOLIN SODIUM 1 G/3ML
INJECTION, POWDER, FOR SOLUTION INTRAMUSCULAR; INTRAVENOUS
Status: DISCONTINUED | OUTPATIENT
Start: 2025-08-27 | End: 2025-08-27

## 2025-08-27 RX ORDER — PROPOFOL 10 MG/ML
VIAL (ML) INTRAVENOUS
Status: DISCONTINUED | OUTPATIENT
Start: 2025-08-27 | End: 2025-08-27

## 2025-08-27 RX ORDER — DEXAMETHASONE SODIUM PHOSPHATE 4 MG/ML
INJECTION, SOLUTION INTRA-ARTICULAR; INTRALESIONAL; INTRAMUSCULAR; INTRAVENOUS; SOFT TISSUE
Status: DISCONTINUED | OUTPATIENT
Start: 2025-08-27 | End: 2025-08-27

## 2025-08-27 RX ORDER — FENTANYL CITRATE 50 UG/ML
INJECTION, SOLUTION INTRAMUSCULAR; INTRAVENOUS
Status: DISCONTINUED | OUTPATIENT
Start: 2025-08-27 | End: 2025-08-27

## 2025-08-27 RX ORDER — KETAMINE HCL IN 0.9 % NACL 50 MG/5 ML
SYRINGE (ML) INTRAVENOUS
Status: DISCONTINUED | OUTPATIENT
Start: 2025-08-27 | End: 2025-08-27

## 2025-08-27 RX ORDER — LIDOCAINE HYDROCHLORIDE 20 MG/ML
INJECTION INTRAVENOUS
Status: DISCONTINUED | OUTPATIENT
Start: 2025-08-27 | End: 2025-08-27

## 2025-08-27 RX ORDER — ROCURONIUM BROMIDE 10 MG/ML
INJECTION, SOLUTION INTRAVENOUS
Status: DISCONTINUED | OUTPATIENT
Start: 2025-08-27 | End: 2025-08-27

## 2025-08-27 RX ADMIN — ONDANSETRON HYDROCHLORIDE 4 MG: 2 INJECTION INTRAMUSCULAR; INTRAVENOUS at 04:08

## 2025-08-27 RX ADMIN — PROPOFOL 200 MG: 10 INJECTION, EMULSION INTRAVENOUS at 02:08

## 2025-08-27 RX ADMIN — DEXAMETHASONE SODIUM PHOSPHATE 4 MG: 4 INJECTION, SOLUTION INTRAMUSCULAR; INTRAVENOUS at 04:08

## 2025-08-27 RX ADMIN — SUGAMMADEX 200 MG: 100 INJECTION, SOLUTION INTRAVENOUS at 04:08

## 2025-08-27 RX ADMIN — HYDROMORPHONE HYDROCHLORIDE 0.5 MG: 2 INJECTION INTRAMUSCULAR; INTRAVENOUS; SUBCUTANEOUS at 02:08

## 2025-08-27 RX ADMIN — Medication 50 MG: at 02:08

## 2025-08-27 RX ADMIN — DEXAMETHASONE SODIUM PHOSPHATE 4 MG: 4 INJECTION, SOLUTION INTRAMUSCULAR; INTRAVENOUS at 02:08

## 2025-08-27 RX ADMIN — CEFAZOLIN 2 G: 330 INJECTION, POWDER, FOR SOLUTION INTRAMUSCULAR; INTRAVENOUS at 02:08

## 2025-08-27 RX ADMIN — HYDROMORPHONE HYDROCHLORIDE 0.5 MG: 2 INJECTION INTRAMUSCULAR; INTRAVENOUS; SUBCUTANEOUS at 03:08

## 2025-08-27 RX ADMIN — FENTANYL CITRATE 100 MCG: 50 INJECTION, SOLUTION INTRAMUSCULAR; INTRAVENOUS at 02:08

## 2025-08-27 RX ADMIN — SODIUM CHLORIDE, POTASSIUM CHLORIDE, SODIUM LACTATE AND CALCIUM CHLORIDE: 600; 310; 30; 20 INJECTION, SOLUTION INTRAVENOUS at 01:08

## 2025-08-27 RX ADMIN — GLYCOPYRROLATE 0.2 MG: 0.2 INJECTION, SOLUTION INTRAMUSCULAR; INTRAVITREAL at 02:08

## 2025-08-27 RX ADMIN — ONDANSETRON HYDROCHLORIDE 4 MG: 2 INJECTION INTRAMUSCULAR; INTRAVENOUS at 02:08

## 2025-08-27 RX ADMIN — ROCURONIUM BROMIDE 30 MG: 10 INJECTION INTRAVENOUS at 02:08

## 2025-08-27 RX ADMIN — ROCURONIUM BROMIDE 20 MG: 10 INJECTION INTRAVENOUS at 03:08

## 2025-08-27 RX ADMIN — ROCURONIUM BROMIDE 50 MG: 10 INJECTION INTRAVENOUS at 02:08

## 2025-08-27 RX ADMIN — SODIUM CHLORIDE, POTASSIUM CHLORIDE, SODIUM LACTATE AND CALCIUM CHLORIDE: 600; 310; 30; 20 INJECTION, SOLUTION INTRAVENOUS at 03:08

## 2025-08-27 RX ADMIN — LIDOCAINE HYDROCHLORIDE 100 MG: 20 INJECTION, SOLUTION INTRAVENOUS at 02:08

## 2025-08-27 RX ADMIN — FENTANYL CITRATE 100 MCG: 50 INJECTION, SOLUTION INTRAMUSCULAR; INTRAVENOUS at 01:08

## 2025-08-29 ENCOUNTER — NURSE TRIAGE (OUTPATIENT)
Dept: ADMINISTRATIVE | Facility: CLINIC | Age: 65
End: 2025-08-29
Payer: COMMERCIAL

## 2025-08-29 ENCOUNTER — TELEPHONE (OUTPATIENT)
Dept: GYNECOLOGIC ONCOLOGY | Facility: CLINIC | Age: 65
End: 2025-08-29
Payer: COMMERCIAL

## 2025-09-02 ENCOUNTER — TELEPHONE (OUTPATIENT)
Dept: GYNECOLOGIC ONCOLOGY | Facility: CLINIC | Age: 65
End: 2025-09-02
Payer: COMMERCIAL